# Patient Record
(demographics unavailable — no encounter records)

---

## 2017-06-09 NOTE — RAD
Right upper extremity venous ultrasound, 6/9/2017:



History: Follow-up blood clots



Duplex evaluation of the major veins in the right shoulder and arm was

performed including grayscale, color-flow and spectral Doppler analysis.



The right internal jugular, subclavian, axillary and paired brachial veins are

patent. The right brachial vein clots seen on the 3/11/2017 study are no

longer evident. The cephalic and basilic veins are patent.





IMPRESSION: No current evidence of significant venous thrombosis in the right

upper extremity. Previously seen right basilic and brachial thrombi have

cleared.

## 2020-06-24 NOTE — RAD
Exam: Right Upper Quadrant Ultrasound 6/24/2020 12:00 AM

 

Indication: Reason: RUQ PAIN, R/O GALLSTONES / Spl. Instructions:  / 

History: 

 

Technique: Multiple realtime grayscale sonographic images were obtained 

over the abdomen. Static images were submitted for interpretation. 

 

Comparisons: None

 

Findings:  

 

Pancreas and IVC are nonvisualized secondary to body habitus and overlying

gas filled bowel. 

 

The liver is enlarged measuring 20 cm longitudinally. There is diffuse 

increase in hepatic echogenicity suggesting hepatic steatosis.

 

The common bile duct is nonvisualized. The gallbladder is nondistended. 

There is no evidence for cholelithiasis. There is no wall thickening, or 

pericholecystic fluid.  

 

The Right kidney is normal in size measuring 11.2 cm longitudinally.  

There is no evidence for mass, nephrolithiasis, or hydronephrosis

 

No ascites is identified

 

Impression:

1. Hepatomegaly, hepatic steatosis

2. Otherwise unremarkable sonographic appearance of the right upper 

quadrant

 

Electronically signed by: Ramo Syed MD (6/24/2020 1:38 PM) [id number]

## 2021-01-28 NOTE — RAD
EXAM:  XR ABDOMEN 2V 1/28/2021 10:40 AM



CLINICAL INDICATION:  Right lower flank pain, constipation



COMPARISON:  None



TECHNIQUE:  3 views of abdomen



FINDINGS:  Bowel gas pattern is nonspecific and nonobstructive. Normal volume of stool. Cholecystecto
my clips are noted. There are phleboliths in the pelvis. No definite urolithiasis. No acute osseous a
bnormality. Lung bases are clear.



IMPRESSION:  Unremarkable abdominal radiograph.



Electronically signed by: Luz Quinn MD (1/28/2021 4:37 PM) [id number]